# Patient Record
Sex: FEMALE
[De-identification: names, ages, dates, MRNs, and addresses within clinical notes are randomized per-mention and may not be internally consistent; named-entity substitution may affect disease eponyms.]

---

## 2021-11-01 PROBLEM — Z00.00 ENCOUNTER FOR PREVENTIVE HEALTH EXAMINATION: Status: ACTIVE | Noted: 2021-11-01

## 2021-11-02 ENCOUNTER — APPOINTMENT (OUTPATIENT)
Dept: RADIOLOGY | Facility: CLINIC | Age: 31
End: 2021-11-02
Payer: COMMERCIAL

## 2021-11-02 ENCOUNTER — OUTPATIENT (OUTPATIENT)
Dept: OUTPATIENT SERVICES | Facility: HOSPITAL | Age: 31
LOS: 1 days | End: 2021-11-02

## 2021-11-02 ENCOUNTER — NON-APPOINTMENT (OUTPATIENT)
Age: 31
End: 2021-11-02

## 2021-11-02 ENCOUNTER — APPOINTMENT (OUTPATIENT)
Dept: PHYSICAL MEDICINE AND REHAB | Facility: CLINIC | Age: 31
End: 2021-11-02
Payer: COMMERCIAL

## 2021-11-02 VITALS
TEMPERATURE: 97.4 F | SYSTOLIC BLOOD PRESSURE: 105 MMHG | HEIGHT: 65 IN | DIASTOLIC BLOOD PRESSURE: 70 MMHG | RESPIRATION RATE: 18 BRPM | WEIGHT: 130 LBS | BODY MASS INDEX: 21.66 KG/M2 | HEART RATE: 99 BPM

## 2021-11-02 VITALS — DIASTOLIC BLOOD PRESSURE: 70 MMHG | SYSTOLIC BLOOD PRESSURE: 105 MMHG | TEMPERATURE: 97 F | HEART RATE: 99 BPM

## 2021-11-02 DIAGNOSIS — Z86.59 PERSONAL HISTORY OF OTHER MENTAL AND BEHAVIORAL DISORDERS: ICD-10-CM

## 2021-11-02 DIAGNOSIS — M62.89 OTHER SPECIFIED DISORDERS OF MUSCLE: ICD-10-CM

## 2021-11-02 DIAGNOSIS — G89.4 CHRONIC PAIN SYNDROME: ICD-10-CM

## 2021-11-02 DIAGNOSIS — F45.41 PAIN DISORDER EXCLUSIVELY RELATED TO PSYCHOLOGICAL FACTORS: ICD-10-CM

## 2021-11-02 DIAGNOSIS — Z86.2 PERSONAL HISTORY OF DISEASES OF THE BLOOD AND BLOOD-FORMING ORGANS AND CERTAIN DISORDERS INVOLVING THE IMMUNE MECHANISM: ICD-10-CM

## 2021-11-02 DIAGNOSIS — M79.18 MYALGIA, OTHER SITE: ICD-10-CM

## 2021-11-02 DIAGNOSIS — R53.83 OTHER MALAISE: ICD-10-CM

## 2021-11-02 DIAGNOSIS — Z82.49 FAMILY HISTORY OF ISCHEMIC HEART DISEASE AND OTHER DISEASES OF THE CIRCULATORY SYSTEM: ICD-10-CM

## 2021-11-02 DIAGNOSIS — Z83.511 FAMILY HISTORY OF GLAUCOMA: ICD-10-CM

## 2021-11-02 DIAGNOSIS — Z80.41 FAMILY HISTORY OF MALIGNANT NEOPLASM OF OVARY: ICD-10-CM

## 2021-11-02 DIAGNOSIS — F41.9 ANXIETY DISORDER, UNSPECIFIED: ICD-10-CM

## 2021-11-02 DIAGNOSIS — G08 INTRACRANIAL AND INTRASPINAL PHLEBITIS AND THROMBOPHLEBITIS: ICD-10-CM

## 2021-11-02 DIAGNOSIS — F32.A ANXIETY DISORDER, UNSPECIFIED: ICD-10-CM

## 2021-11-02 DIAGNOSIS — R52 PAIN, UNSPECIFIED: ICD-10-CM

## 2021-11-02 DIAGNOSIS — F43.10 POST-TRAUMATIC STRESS DISORDER, UNSPECIFIED: ICD-10-CM

## 2021-11-02 DIAGNOSIS — R53.81 OTHER MALAISE: ICD-10-CM

## 2021-11-02 DIAGNOSIS — Z86.718 PERSONAL HISTORY OF OTHER VENOUS THROMBOSIS AND EMBOLISM: ICD-10-CM

## 2021-11-02 PROCEDURE — 72110 X-RAY EXAM L-2 SPINE 4/>VWS: CPT | Mod: 26

## 2021-11-02 PROCEDURE — 73523 X-RAY EXAM HIPS BI 5/> VIEWS: CPT | Mod: 26

## 2021-11-02 PROCEDURE — 99205 OFFICE O/P NEW HI 60 MIN: CPT

## 2021-11-02 RX ORDER — BUPROPION HYDROCHLORIDE 300 MG/1
300 TABLET, EXTENDED RELEASE ORAL
Refills: 0 | Status: ACTIVE | COMMUNITY

## 2021-11-02 RX ORDER — GABAPENTIN 250 MG/5ML
300 SOLUTION ORAL
Refills: 0 | Status: ACTIVE | COMMUNITY

## 2021-11-02 RX ORDER — FLUOXETINE HYDROCHLORIDE 60 MG/1
TABLET ORAL
Refills: 0 | Status: ACTIVE | COMMUNITY

## 2021-11-02 RX ORDER — CLONAZEPAM 2 MG/1
TABLET ORAL
Refills: 0 | Status: ACTIVE | COMMUNITY
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
## 2021-11-02 NOTE — REASON FOR VISIT
[Initial Evaluation] : an initial evaluation [Spouse] : spouse [FreeTextEntry1] : ref by robles joel

## 2021-11-02 NOTE — PHYSICAL EXAM
[FreeTextEntry1] : PHYSICAL EXAM : OBJECTIVE examined with her fiance in room \par \par GENERAL : Awake ,alert and oriented to time place and person \par HEAD : normocephalic and atraumatic \par NECK : supple ,no tracheal deviation ,no thyroid enlargement noted with swallowing\par EYES : sclera and conjunctiva normal no redness,intact extraocular movements \par ENT  : ears and nose normal in appearance -hearing adequate \par PULMONARY: effort normal. No respiratory distress. breathing regular. No wheezes \par LYMPH : No swelling in limbs, capillary return within normal range \par CVS : warm extremities,no palpitations,not short of breath, no visible jugular venous distention\par PSYCH : mood and affect depressed and anxious slow historian  ,fair  eye contact ,crying at times  \par ABDOMEN : no visible distension , \par NEUROLOGICAL:cranial nerves intact,muscle tone normal,gait and balance safe except where noted below \par SKIN : warm and dry No rash detected over specific body areas examined \par MUSCULOSKELETAL: normal muscle bulk, no focal bony tenderness /posture normal except where specified below\par low back exam very limber spine and hips \par full ROM very hypermobile \par can easily touch floor with hands \par No long tract signs found on clinical exam and no focal neurological deficits\par sensation intact \par gait NL \par heel toe good \par some trigger points upper neck L>R \par has trigger point R jaw masseter\par

## 2021-11-02 NOTE — REVIEW OF SYSTEMS
[Patient Intake Form Reviewed] : Patient intake form was reviewed [Fatigue] : fatigue [Joint Pain] : joint pain [Joint Stiffness] : joint stiffness [Muscle Pain] : muscle pain [Muscle Weakness] : muscle weakness [Anxiety] : anxiety [Depression] : depression [Negative] : Heme/Lymph [Fever] : no fever [Recent Change In Weight] : ~T no recent weight change [Lower Ext Edema] : no lower extremity edema [Difficulty Walking] : no difficulty walking [de-identified] : crying a lot during exam

## 2021-11-02 NOTE — DATA REVIEWED
[Plain X-Rays] : plain X-Rays [FreeTextEntry1] : checked by me -no abnormalities of pelvis seen -normal images

## 2021-11-02 NOTE — CONSULT LETTER
[FreeTextEntry1] : Dear Dr. NIRMALA AQUINO  , \par \par I had the pleasure of evaluating your patient, CHRISTINE BARNETT .\par \par Thank you very much for allowing me to participate in the care of this patient. If you have any questions, please do not hesitate to contact me. \par \par Sincerely, \par Nico Jones MD \par \par ABPMR Board Certified in Physical Medicine and Rehabilitation\par Certified Fellow of AANEM (Neuromuscular and Electrodiagnostic Medicine)\par Subspecialty certified in Sports Medicine (ABPMR)\par \par  of Physical Medicine and Rehabilitation\par [facility] [employer] [facility]\par [facility]\par \par
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
## 2021-11-06 ENCOUNTER — TRANSCRIPTION ENCOUNTER (OUTPATIENT)
Age: 31
End: 2021-11-06

## 2021-11-08 ENCOUNTER — APPOINTMENT (OUTPATIENT)
Dept: PHYSICAL MEDICINE AND REHAB | Facility: CLINIC | Age: 31
End: 2021-11-08
Payer: COMMERCIAL

## 2021-11-08 VITALS — WEIGHT: 130 LBS | BODY MASS INDEX: 21.66 KG/M2 | HEIGHT: 65 IN | RESPIRATION RATE: 18 BRPM

## 2021-11-08 DIAGNOSIS — M54.59 OTHER LOW BACK PAIN: ICD-10-CM

## 2021-11-08 DIAGNOSIS — G89.29 LOW BACK PAIN, UNSPECIFIED: ICD-10-CM

## 2021-11-08 DIAGNOSIS — M79.9 SOFT TISSUE DISORDER, UNSPECIFIED: ICD-10-CM

## 2021-11-08 DIAGNOSIS — M54.50 LOW BACK PAIN, UNSPECIFIED: ICD-10-CM

## 2021-11-08 DIAGNOSIS — M47.819 SPONDYLOSIS W/OUT MYELOPATHY OR RADICULOPATHY, SITE UNSPECIFIED: ICD-10-CM

## 2021-11-08 DIAGNOSIS — M41.80 OTHER FORMS OF SCOLIOSIS, SITE UNSPECIFIED: ICD-10-CM

## 2021-11-08 DIAGNOSIS — M51.37 OTHER INTERVERTEBRAL DISC DEGENERATION, LUMBOSACRAL REGION: ICD-10-CM

## 2021-11-08 PROCEDURE — 99214 OFFICE O/P EST MOD 30 MIN: CPT | Mod: 95

## 2021-11-08 NOTE — HISTORY OF PRESENT ILLNESS
[Home] : at home, [unfilled] , at the time of the visit. [Medical Office: ([facility])___] : at the medical office located in  [Partner] : partner [Verbal consent obtained from patient] : the patient, [unfilled] [FreeTextEntry1] : Ms. CHRISTINE BARNETT is a   31 year female  seen for  discussion of imaging done as work up -main emphasis on her chronic lower back pain  ongoing for year s  she has seen chiropractors in past and does bickram hot yoga and swims\par still pain has been intractable erica last few weeks  \par imaging of her hips and spine have not been ordered prior \par xrays show significant arthritis of her L5-S1 disc space with severe loss of space and ant and post osteophytes \par she has a dextro scoliosis that she has never been aware of \par \par The pain primarily low back  constant  in nature and she  described as dull/ sharp /cramping shooting stabbing  . The pain is rated as4/10 during today's visit, and ranges from 2-10/10. The patient's symptoms are aggravated by walking/ bending  forwards or backwards /lying down  has to change positions a lot   and alleviated by heat or epsom warm baths \par she  denies any night pain, numbness/tingling, weakness, or bowel/bladder dysfunction. 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

## 2021-11-08 NOTE — CONSULT LETTER
[FreeTextEntry1] : Dear Dr. NIRMALA AQUINO  , \par \par I had the pleasure of evaluating your patient, CHRISTINE BARNETT .\par \par Thank you very much for allowing me to participate in the care of this patient. If you have any questions, please do not hesitate to contact me. \par \par Sincerely, \par Nico Jones MD \par \par ABPMR Board Certified in Physical Medicine and Rehabilitation\par Certified Fellow of AANEM (Neuromuscular and Electrodiagnostic Medicine)\par Subspecialty certified in Sports Medicine (ABPMR)\par \par  of Physical Medicine and Rehabilitation\par [facility] [employer] [facility]\par [facility]\par \par

## 2021-11-08 NOTE — ASSESSMENT
[FreeTextEntry1] : \par PLAN AND RECOMMENDATIONS :\par \par We discussed differential diagnosis and clinical impression\par To help the patient understand their condition a plastic 3D model was used to better explain.\par explained that her scoliosis has caused premature wear and tear of the disc at L5-S1\par yoga is good as well as side planks and postural exercises \par pelvic floor therapist should be able to incorporate such into her RX \par cont anti inflall foods \par fish oil also good \par \par Recommend\par .symptomatic care and support\par  medications NSAIDS as needed -  OTC fine (-personal preference )-(once or twice a day), -warned of  possible GI side effects -advised to take with meals or add over the counter Nexium, if sensitive\par \par  imaging done for now \par  referral to pelvic floor [facility] \par  hydrotherapy /heat / cold for pain\par  continue  spinal precautions including care with bending, lifting, twisting and  carrying.\par \par  relative rest and avoidance of painful activity where possible \par  increasing activity as discussed \par  return for follow up 3 months \par \par time taken 30 minutes -she and fiance had a lot of questions \par she said she felt relieved that a cause has been found for her pain \par explained her scoliosis is genetic/ common and nothing to do with previous trauma and exercises can help the symptoms -posture re education very important \par she was very anxious  prior and felt no one in the med establishment believed her

## 2021-11-08 NOTE — PHYSICAL EXAM
[Normal] : Oriented to person, place, and time, insight and judgement were intact and the affect was normal [de-identified] : less anxious today less labile

## 2021-11-08 NOTE — DATA REVIEWED
[Plain X-Rays] : plain X-Rays [FreeTextEntry1] : \par  Xray Lumbosacral Spine 4 Views w/ Flexion and Extension             Final\par   EXAM:  XR LS SPINE FLEX EXT MIN 4V\par \par PROCEDURE DATE:  11/02/2021\par \par INTERPRETATION:  PROCEDURE: AP, lateral and flexion-extension views (4) of the Lumbar spine\par \par INDICATION: Back pain\par \par COMPARISON: None\par \par FINDINGS: There is scoliosis of the thoracolumbar spine with the apex to the right centered at the L1 level. There is no abnormal motion. The there is moderate to severe loss of intervertebral disc space height at L5/S1 with anterior and posterior marginal osteophytes. The vertebral body heights and rest of the intervertebral disc spaces are maintained. The osseous structures are intact without fracture.\par \par IMPRESSION: Thoracolumbar dextroscoliosis. Lumbar spondylosis L5/S1.\par \par   EXAM:  XR PELVIS COMPLETE MIN 3 VIEWS\par EXAM:  XR HIPS BI 3-4V\par \par PROCEDURE DATE:  11/02/2021\par \par INTERPRETATION:  INDICATION: Pelvis/bilateral hip pain\par \par TECHNIQUE: 3 views of the pelvis and 2 views of each hip\par \par COMPARISON: None available\par \par FINDINGS:\par \par There is no fracture or dislocation. The joint spaces and alignment are preserved.  There is no focal soft tissue abnormality.\par \par \par IMPRESSION:\par \par No significant hip joint space narrowing.\par \par \par \par \par \par \par     \par \par \par

## 2021-11-08 NOTE — REVIEW OF SYSTEMS
[Patient Intake Form Reviewed] : Patient intake form was reviewed [Fever] : no fever [Recent Change In Weight] : ~T no recent weight change [Lower Ext Edema] : no lower extremity edema [Negative] : Heme/Lymph

## 2021-11-18 ENCOUNTER — APPOINTMENT (OUTPATIENT)
Dept: UROLOGY | Facility: CLINIC | Age: 31
End: 2021-11-18
Payer: COMMERCIAL

## 2021-11-18 VITALS
HEART RATE: 95 BPM | SYSTOLIC BLOOD PRESSURE: 127 MMHG | OXYGEN SATURATION: 96 % | TEMPERATURE: 98.1 F | DIASTOLIC BLOOD PRESSURE: 76 MMHG

## 2021-11-18 PROCEDURE — 99204 OFFICE O/P NEW MOD 45 MIN: CPT

## 2021-11-18 PROCEDURE — 51798 US URINE CAPACITY MEASURE: CPT

## 2021-11-18 PROCEDURE — 81003 URINALYSIS AUTO W/O SCOPE: CPT | Mod: QW

## 2021-11-22 LAB
BACTERIA UR CULT: NORMAL
BILIRUB UR QL STRIP: NORMAL
CLARITY UR: CLEAR
COLLECTION METHOD: NORMAL
GLUCOSE UR-MCNC: NORMAL
HCG UR QL: 0.2 EU/DL
HGB UR QL STRIP.AUTO: NORMAL
KETONES UR-MCNC: NORMAL
LEUKOCYTE ESTERASE UR QL STRIP: NORMAL
NITRITE UR QL STRIP: NORMAL
PH UR STRIP: 6.5
PROT UR STRIP-MCNC: NORMAL
SP GR UR STRIP: 1.02

## 2021-11-22 NOTE — LETTER BODY
[Dear  ___] : Dear  [unfilled], [Consult Letter:] : I had the pleasure of evaluating your patient, [unfilled]. [Please see my note below.] : Please see my note below. [Consult Closing:] : Thank you very much for allowing me to participate in the care of this patient.  If you have any questions, please do not hesitate to contact me. [Sincerely,] : Sincerely, [FreeTextEntry3] : Dr. Kary Loco

## 2021-11-22 NOTE — ADDENDUM
[FreeTextEntry1] : A portion of this note was written by [Monica Deng] on 11/18/2021 acting as a scribe for Dr. Loco. \par \par I have personally reviewed the chart and agree that the record accurately reflects my personal performance of the history, physical exam, assessment, and plan.

## 2021-11-22 NOTE — HISTORY OF PRESENT ILLNESS
[FreeTextEntry1] : Pt is a 30 yo female  presenting today with possible pelvic floor dysfunction.  She complains of bilateral lower back pain and pelvic pain that worsens with prolonged sitting.  Denies hematuria, hx of UTI's, and bladder complaints.  She is sexually active and denies dyspareunia.  \par \par Udip: negative\par \par PMH: DVT, anemia, blood clots, dextroscoliosis, PTSD, depression, anxiety\par PSH: tonsillectomy \par FH: prostate CA, cardiac disorder (mother, father), glaucoma (mother) HTN (mother), ovarian CA (mother) \par SH: non-smoker, rare alcohol, life partner\par \par \par Allergies: NKDA \par Meds: bupropion, clonazepam, fluoxetine, gabapentin

## 2021-11-22 NOTE — PHYSICAL EXAM

## 2021-11-22 NOTE — ASSESSMENT
[FreeTextEntry1] : Pt is a 32 yo F with chronic back and pelvic pain.  I think she will benefit from pelvic floor physical therapy and I have given her the appropriate referral. Today's urine was sent for culture. \par \par Patient expressed understanding.